# Patient Record
Sex: MALE | Race: WHITE | NOT HISPANIC OR LATINO | URBAN - METROPOLITAN AREA
[De-identification: names, ages, dates, MRNs, and addresses within clinical notes are randomized per-mention and may not be internally consistent; named-entity substitution may affect disease eponyms.]

---

## 2020-01-23 ENCOUNTER — APPOINTMENT (OUTPATIENT)
Dept: RADIOLOGY | Facility: MEDICAL CENTER | Age: 85
End: 2020-01-23
Attending: EMERGENCY MEDICINE
Payer: COMMERCIAL

## 2020-01-23 ENCOUNTER — HOSPITAL ENCOUNTER (OUTPATIENT)
Facility: MEDICAL CENTER | Age: 85
End: 2020-01-26
Attending: EMERGENCY MEDICINE | Admitting: INTERNAL MEDICINE
Payer: COMMERCIAL

## 2020-01-23 DIAGNOSIS — R55 SYNCOPE, UNSPECIFIED SYNCOPE TYPE: ICD-10-CM

## 2020-01-23 PROBLEM — F10.929 ALCOHOL INTOXICATION (HCC): Status: ACTIVE | Noted: 2020-01-23

## 2020-01-23 PROBLEM — Z95.0 PACEMAKER: Status: ACTIVE | Noted: 2020-01-23

## 2020-01-23 PROBLEM — D69.6 THROMBOCYTOPENIA (HCC): Status: ACTIVE | Noted: 2020-01-23

## 2020-01-23 PROBLEM — I25.10 CORONARY ARTERY DISEASE INVOLVING NATIVE CORONARY ARTERY: Status: ACTIVE | Noted: 2020-01-23

## 2020-01-23 LAB
ALBUMIN SERPL BCP-MCNC: 4 G/DL (ref 3.2–4.9)
ALBUMIN/GLOB SERPL: 1.3 G/DL
ALP SERPL-CCNC: 84 U/L (ref 30–99)
ALT SERPL-CCNC: 17 U/L (ref 2–50)
ANION GAP SERPL CALC-SCNC: 21 MMOL/L (ref 0–11.9)
APTT PPP: 31.3 SEC (ref 24.7–36)
AST SERPL-CCNC: 24 U/L (ref 12–45)
BASOPHILS # BLD AUTO: 0.8 % (ref 0–1.8)
BASOPHILS # BLD: 0.07 K/UL (ref 0–0.12)
BILIRUB SERPL-MCNC: 1.5 MG/DL (ref 0.1–1.5)
BUN SERPL-MCNC: 25 MG/DL (ref 8–22)
CALCIUM SERPL-MCNC: 9.1 MG/DL (ref 8.4–10.2)
CHLORIDE SERPL-SCNC: 107 MMOL/L (ref 96–112)
CO2 SERPL-SCNC: 17 MMOL/L (ref 20–33)
COMMENT 1642: NORMAL
CREAT SERPL-MCNC: 1.59 MG/DL (ref 0.5–1.4)
EKG IMPRESSION: NORMAL
EOSINOPHIL # BLD AUTO: 0.13 K/UL (ref 0–0.51)
EOSINOPHIL NFR BLD: 1.4 % (ref 0–6.9)
ERYTHROCYTE [DISTWIDTH] IN BLOOD BY AUTOMATED COUNT: 54.4 FL (ref 35.9–50)
ETHANOL BLD-MCNC: 76 MG/DL (ref 0–10)
GLOBULIN SER CALC-MCNC: 3 G/DL (ref 1.9–3.5)
GLUCOSE SERPL-MCNC: 131 MG/DL (ref 65–99)
HCT VFR BLD AUTO: 48.2 % (ref 42–52)
HGB BLD-MCNC: 15.4 G/DL (ref 14–18)
IMM GRANULOCYTES # BLD AUTO: 0.03 K/UL (ref 0–0.11)
IMM GRANULOCYTES NFR BLD AUTO: 0.3 % (ref 0–0.9)
INR PPP: 1.47 (ref 0.87–1.13)
LYMPHOCYTES # BLD AUTO: 1.61 K/UL (ref 1–4.8)
LYMPHOCYTES NFR BLD: 17.4 % (ref 22–41)
MCH RBC QN AUTO: 29.1 PG (ref 27–33)
MCHC RBC AUTO-ENTMCNC: 32 G/DL (ref 33.7–35.3)
MCV RBC AUTO: 91.1 FL (ref 81.4–97.8)
MONOCYTES # BLD AUTO: 0.67 K/UL (ref 0–0.85)
MONOCYTES NFR BLD AUTO: 7.3 % (ref 0–13.4)
NEUTROPHILS # BLD AUTO: 6.73 K/UL (ref 1.82–7.42)
NEUTROPHILS NFR BLD: 72.8 % (ref 44–72)
NRBC # BLD AUTO: 0 K/UL
NRBC BLD-RTO: 0 /100 WBC
PLATELET # BLD AUTO: 68 K/UL (ref 164–446)
PMV BLD AUTO: 11.8 FL (ref 9–12.9)
POTASSIUM SERPL-SCNC: 4.1 MMOL/L (ref 3.6–5.5)
PROT SERPL-MCNC: 7 G/DL (ref 6–8.2)
PROTHROMBIN TIME: 18.1 SEC (ref 12–14.6)
RBC # BLD AUTO: 5.29 M/UL (ref 4.7–6.1)
SODIUM SERPL-SCNC: 145 MMOL/L (ref 135–145)
TROPONIN T SERPL-MCNC: 44 NG/L (ref 6–19)
WBC # BLD AUTO: 9.2 K/UL (ref 4.8–10.8)

## 2020-01-23 PROCEDURE — G0378 HOSPITAL OBSERVATION PER HR: HCPCS

## 2020-01-23 PROCEDURE — 71045 X-RAY EXAM CHEST 1 VIEW: CPT

## 2020-01-23 PROCEDURE — 80053 COMPREHEN METABOLIC PANEL: CPT

## 2020-01-23 PROCEDURE — 93005 ELECTROCARDIOGRAM TRACING: CPT | Performed by: EMERGENCY MEDICINE

## 2020-01-23 PROCEDURE — 85025 COMPLETE CBC W/AUTO DIFF WBC: CPT

## 2020-01-23 PROCEDURE — 700111 HCHG RX REV CODE 636 W/ 250 OVERRIDE (IP)

## 2020-01-23 PROCEDURE — 70450 CT HEAD/BRAIN W/O DYE: CPT

## 2020-01-23 PROCEDURE — 80307 DRUG TEST PRSMV CHEM ANLYZR: CPT

## 2020-01-23 PROCEDURE — 99219 PR INITIAL OBSERVATION CARE,LEVL II: CPT | Performed by: INTERNAL MEDICINE

## 2020-01-23 PROCEDURE — 85610 PROTHROMBIN TIME: CPT

## 2020-01-23 PROCEDURE — 72125 CT NECK SPINE W/O DYE: CPT

## 2020-01-23 PROCEDURE — 85730 THROMBOPLASTIN TIME PARTIAL: CPT

## 2020-01-23 PROCEDURE — 99285 EMERGENCY DEPT VISIT HI MDM: CPT

## 2020-01-23 PROCEDURE — 84484 ASSAY OF TROPONIN QUANT: CPT

## 2020-01-23 RX ORDER — AMOXICILLIN 250 MG
2 CAPSULE ORAL 2 TIMES DAILY
Status: DISCONTINUED | OUTPATIENT
Start: 2020-01-23 | End: 2020-01-26 | Stop reason: HOSPADM

## 2020-01-23 RX ORDER — POLYETHYLENE GLYCOL 3350 17 G/17G
1 POWDER, FOR SOLUTION ORAL
Status: DISCONTINUED | OUTPATIENT
Start: 2020-01-23 | End: 2020-01-26 | Stop reason: HOSPADM

## 2020-01-23 RX ORDER — FOLIC ACID 1 MG/1
1 TABLET ORAL DAILY
Status: DISCONTINUED | OUTPATIENT
Start: 2020-01-24 | End: 2020-01-26 | Stop reason: HOSPADM

## 2020-01-23 RX ORDER — ONDANSETRON 2 MG/ML
4 INJECTION INTRAMUSCULAR; INTRAVENOUS ONCE
Status: ACTIVE | OUTPATIENT
Start: 2020-01-23 | End: 2020-01-24

## 2020-01-23 RX ORDER — THIAMINE MONONITRATE (VIT B1) 100 MG
100 TABLET ORAL DAILY
Status: DISCONTINUED | OUTPATIENT
Start: 2020-01-24 | End: 2020-01-26 | Stop reason: HOSPADM

## 2020-01-23 RX ORDER — ONDANSETRON 2 MG/ML
INJECTION INTRAMUSCULAR; INTRAVENOUS
Status: COMPLETED
Start: 2020-01-23 | End: 2020-01-23

## 2020-01-23 RX ORDER — BISACODYL 10 MG
10 SUPPOSITORY, RECTAL RECTAL
Status: DISCONTINUED | OUTPATIENT
Start: 2020-01-23 | End: 2020-01-26 | Stop reason: HOSPADM

## 2020-01-23 RX ADMIN — ONDANSETRON 4 MG: 2 INJECTION INTRAMUSCULAR; INTRAVENOUS at 16:06

## 2020-01-23 ASSESSMENT — COGNITIVE AND FUNCTIONAL STATUS - GENERAL
DAILY ACTIVITIY SCORE: 24
SUGGESTED CMS G CODE MODIFIER DAILY ACTIVITY: CH
MOBILITY SCORE: 24
SUGGESTED CMS G CODE MODIFIER MOBILITY: CH

## 2020-01-23 ASSESSMENT — ENCOUNTER SYMPTOMS
ABDOMINAL PAIN: 0
NAUSEA: 0
WEAKNESS: 0
NERVOUS/ANXIOUS: 0
DIARRHEA: 0
MYALGIAS: 0
DIZZINESS: 0
FEVER: 0
SHORTNESS OF BREATH: 0
DEPRESSION: 1
FOCAL WEAKNESS: 0
CHILLS: 0
STRIDOR: 0
CONSTIPATION: 0
DIAPHORESIS: 0
COUGH: 0
BACK PAIN: 0
INSOMNIA: 0
TINGLING: 0
HEADACHES: 0
WHEEZING: 0

## 2020-01-23 ASSESSMENT — LIFESTYLE VARIABLES
ON A TYPICAL DAY WHEN YOU DRINK ALCOHOL HOW MANY DRINKS DO YOU HAVE: 1
EVER_SMOKED: YES
EVER HAD A DRINK FIRST THING IN THE MORNING TO STEADY YOUR NERVES TO GET RID OF A HANGOVER: NO
HOW MANY TIMES IN THE PAST YEAR HAVE YOU HAD 5 OR MORE DRINKS IN A DAY: 0
ALCOHOL_USE: YES
TOTAL SCORE: 0
TOTAL SCORE: 0
CONSUMPTION TOTAL: NEGATIVE
EVER FELT BAD OR GUILTY ABOUT YOUR DRINKING: NO
EVER_SMOKED: YES
HAVE PEOPLE ANNOYED YOU BY CRITICIZING YOUR DRINKING: NO
AVERAGE NUMBER OF DAYS PER WEEK YOU HAVE A DRINK CONTAINING ALCOHOL: 2
HAVE YOU EVER FELT YOU SHOULD CUT DOWN ON YOUR DRINKING: NO
TOTAL SCORE: 0

## 2020-01-23 ASSESSMENT — COPD QUESTIONNAIRES
HAVE YOU SMOKED AT LEAST 100 CIGARETTES IN YOUR ENTIRE LIFE: NO/DON'T KNOW
DURING THE PAST 4 WEEKS HOW MUCH DID YOU FEEL SHORT OF BREATH: NONE/LITTLE OF THE TIME
DO YOU EVER COUGH UP ANY MUCUS OR PHLEGM?: NO/ONLY WITH OCCASIONAL COLDS OR INFECTIONS
COPD SCREENING SCORE: 2

## 2020-01-23 ASSESSMENT — PATIENT HEALTH QUESTIONNAIRE - PHQ9
2. FEELING DOWN, DEPRESSED, IRRITABLE, OR HOPELESS: NOT AT ALL
1. LITTLE INTEREST OR PLEASURE IN DOING THINGS: NOT AT ALL
SUM OF ALL RESPONSES TO PHQ9 QUESTIONS 1 AND 2: 0

## 2020-01-24 ENCOUNTER — PATIENT OUTREACH (OUTPATIENT)
Dept: HEALTH INFORMATION MANAGEMENT | Facility: OTHER | Age: 85
End: 2020-01-24

## 2020-01-24 ENCOUNTER — APPOINTMENT (OUTPATIENT)
Dept: CARDIOLOGY | Facility: MEDICAL CENTER | Age: 85
End: 2020-01-24
Attending: INTERNAL MEDICINE
Payer: COMMERCIAL

## 2020-01-24 PROBLEM — I50.23 ACUTE ON CHRONIC SYSTOLIC CONGESTIVE HEART FAILURE (HCC): Status: ACTIVE | Noted: 2020-01-24

## 2020-01-24 PROBLEM — I51.3 LV (LEFT VENTRICULAR) MURAL THROMBUS: Status: ACTIVE | Noted: 2020-01-24

## 2020-01-24 LAB
ANION GAP SERPL CALC-SCNC: 11 MMOL/L (ref 0–11.9)
BASOPHILS # BLD AUTO: 0.6 % (ref 0–1.8)
BASOPHILS # BLD: 0.06 K/UL (ref 0–0.12)
BUN SERPL-MCNC: 27 MG/DL (ref 8–22)
CALCIUM SERPL-MCNC: 8.9 MG/DL (ref 8.4–10.2)
CHLORIDE SERPL-SCNC: 108 MMOL/L (ref 96–112)
CO2 SERPL-SCNC: 26 MMOL/L (ref 20–33)
CREAT SERPL-MCNC: 1.67 MG/DL (ref 0.5–1.4)
EOSINOPHIL # BLD AUTO: 0.13 K/UL (ref 0–0.51)
EOSINOPHIL NFR BLD: 1.4 % (ref 0–6.9)
ERYTHROCYTE [DISTWIDTH] IN BLOOD BY AUTOMATED COUNT: 54.9 FL (ref 35.9–50)
GLUCOSE SERPL-MCNC: 112 MG/DL (ref 65–99)
HCT VFR BLD AUTO: 46.6 % (ref 42–52)
HGB BLD-MCNC: 14.8 G/DL (ref 14–18)
IMM GRANULOCYTES # BLD AUTO: 0.02 K/UL (ref 0–0.11)
IMM GRANULOCYTES NFR BLD AUTO: 0.2 % (ref 0–0.9)
LV EJECT FRACT  99904: 30
LV EJECT FRACT MOD 2C 99903: 31.9
LV EJECT FRACT MOD 4C 99902: 30.99
LV EJECT FRACT MOD BP 99901: 30.75
LYMPHOCYTES # BLD AUTO: 1.34 K/UL (ref 1–4.8)
LYMPHOCYTES NFR BLD: 14.2 % (ref 22–41)
MCH RBC QN AUTO: 29.2 PG (ref 27–33)
MCHC RBC AUTO-ENTMCNC: 31.8 G/DL (ref 33.7–35.3)
MCV RBC AUTO: 91.9 FL (ref 81.4–97.8)
MONOCYTES # BLD AUTO: 0.78 K/UL (ref 0–0.85)
MONOCYTES NFR BLD AUTO: 8.3 % (ref 0–13.4)
NEUTROPHILS # BLD AUTO: 7.12 K/UL (ref 1.82–7.42)
NEUTROPHILS NFR BLD: 75.3 % (ref 44–72)
NRBC # BLD AUTO: 0 K/UL
NRBC BLD-RTO: 0 /100 WBC
PLATELET # BLD AUTO: 159 K/UL (ref 164–446)
PMV BLD AUTO: 10.7 FL (ref 9–12.9)
POTASSIUM SERPL-SCNC: 4 MMOL/L (ref 3.6–5.5)
RBC # BLD AUTO: 5.07 M/UL (ref 4.7–6.1)
SODIUM SERPL-SCNC: 145 MMOL/L (ref 135–145)
WBC # BLD AUTO: 9.5 K/UL (ref 4.8–10.8)

## 2020-01-24 PROCEDURE — A9270 NON-COVERED ITEM OR SERVICE: HCPCS | Performed by: INTERNAL MEDICINE

## 2020-01-24 PROCEDURE — 700117 HCHG RX CONTRAST REV CODE 255: Performed by: INTERNAL MEDICINE

## 2020-01-24 PROCEDURE — 700111 HCHG RX REV CODE 636 W/ 250 OVERRIDE (IP): Performed by: INTERNAL MEDICINE

## 2020-01-24 PROCEDURE — 93306 TTE W/DOPPLER COMPLETE: CPT

## 2020-01-24 PROCEDURE — 700102 HCHG RX REV CODE 250 W/ 637 OVERRIDE(OP): Performed by: INTERNAL MEDICINE

## 2020-01-24 PROCEDURE — 85025 COMPLETE CBC W/AUTO DIFF WBC: CPT

## 2020-01-24 PROCEDURE — 96374 THER/PROPH/DIAG INJ IV PUSH: CPT | Mod: XU

## 2020-01-24 PROCEDURE — 99226 PR SUBSEQUENT OBSERVATION CARE,LEVEL III: CPT | Performed by: INTERNAL MEDICINE

## 2020-01-24 PROCEDURE — G0378 HOSPITAL OBSERVATION PER HR: HCPCS

## 2020-01-24 PROCEDURE — 80048 BASIC METABOLIC PNL TOTAL CA: CPT

## 2020-01-24 PROCEDURE — 93306 TTE W/DOPPLER COMPLETE: CPT | Mod: 26 | Performed by: INTERNAL MEDICINE

## 2020-01-24 RX ORDER — TAMSULOSIN HYDROCHLORIDE 0.4 MG/1
0.4 CAPSULE ORAL
COMMUNITY

## 2020-01-24 RX ORDER — SPIRONOLACTONE 25 MG/1
25 TABLET ORAL
Status: DISCONTINUED | OUTPATIENT
Start: 2020-01-24 | End: 2020-01-26 | Stop reason: HOSPADM

## 2020-01-24 RX ORDER — LOSARTAN POTASSIUM 25 MG/1
50 TABLET ORAL DAILY
Status: DISCONTINUED | OUTPATIENT
Start: 2020-01-24 | End: 2020-01-26 | Stop reason: HOSPADM

## 2020-01-24 RX ORDER — ALLOPURINOL 100 MG/1
100 TABLET ORAL 2 TIMES DAILY
COMMUNITY

## 2020-01-24 RX ORDER — TAMSULOSIN HYDROCHLORIDE 0.4 MG/1
0.4 CAPSULE ORAL
Status: DISCONTINUED | OUTPATIENT
Start: 2020-01-24 | End: 2020-01-26 | Stop reason: HOSPADM

## 2020-01-24 RX ORDER — ATORVASTATIN CALCIUM 40 MG/1
40 TABLET, FILM COATED ORAL NIGHTLY
COMMUNITY

## 2020-01-24 RX ORDER — FUROSEMIDE 10 MG/ML
20 INJECTION INTRAMUSCULAR; INTRAVENOUS ONCE
Status: COMPLETED | OUTPATIENT
Start: 2020-01-24 | End: 2020-01-24

## 2020-01-24 RX ORDER — METOPROLOL SUCCINATE 25 MG/1
25 TABLET, EXTENDED RELEASE ORAL
Status: DISCONTINUED | OUTPATIENT
Start: 2020-01-24 | End: 2020-01-26 | Stop reason: HOSPADM

## 2020-01-24 RX ORDER — FINASTERIDE 5 MG/1
5 TABLET, FILM COATED ORAL DAILY
Status: DISCONTINUED | OUTPATIENT
Start: 2020-01-24 | End: 2020-01-26 | Stop reason: HOSPADM

## 2020-01-24 RX ORDER — ATORVASTATIN CALCIUM 40 MG/1
40 TABLET, FILM COATED ORAL NIGHTLY
Status: DISCONTINUED | OUTPATIENT
Start: 2020-01-24 | End: 2020-01-26 | Stop reason: HOSPADM

## 2020-01-24 RX ORDER — WARFARIN SODIUM 5 MG/1
5 TABLET ORAL
Status: COMPLETED | OUTPATIENT
Start: 2020-01-24 | End: 2020-01-24

## 2020-01-24 RX ORDER — FUROSEMIDE 10 MG/ML
40 INJECTION INTRAMUSCULAR; INTRAVENOUS
Status: DISCONTINUED | OUTPATIENT
Start: 2020-01-25 | End: 2020-01-25

## 2020-01-24 RX ORDER — LOSARTAN POTASSIUM 50 MG/1
50 TABLET ORAL DAILY
COMMUNITY

## 2020-01-24 RX ORDER — WARFARIN SODIUM 5 MG/1
5 TABLET ORAL DAILY
COMMUNITY

## 2020-01-24 RX ORDER — FINASTERIDE 5 MG/1
5 TABLET, FILM COATED ORAL DAILY
COMMUNITY

## 2020-01-24 RX ORDER — WARFARIN SODIUM 5 MG/1
5 TABLET ORAL DAILY
Status: DISCONTINUED | OUTPATIENT
Start: 2020-01-24 | End: 2020-01-24

## 2020-01-24 RX ORDER — FUROSEMIDE 40 MG/1
60 TABLET ORAL DAILY
COMMUNITY

## 2020-01-24 RX ADMIN — FOLIC ACID 1 MG: 1 TABLET ORAL at 05:42

## 2020-01-24 RX ADMIN — TAMSULOSIN HYDROCHLORIDE 0.4 MG: 0.4 CAPSULE ORAL at 14:17

## 2020-01-24 RX ADMIN — ATORVASTATIN CALCIUM 40 MG: 40 TABLET, FILM COATED ORAL at 21:15

## 2020-01-24 RX ADMIN — Medication 100 MG: at 05:42

## 2020-01-24 RX ADMIN — FUROSEMIDE 20 MG: 10 INJECTION, SOLUTION INTRAMUSCULAR; INTRAVENOUS at 14:15

## 2020-01-24 RX ADMIN — WARFARIN SODIUM 5 MG: 5 TABLET ORAL at 17:05

## 2020-01-24 RX ADMIN — HUMAN ALBUMIN MICROSPHERES AND PERFLUTREN 3 ML: 10; .22 INJECTION, SOLUTION INTRAVENOUS at 09:30

## 2020-01-24 RX ADMIN — SPIRONOLACTONE 25 MG: 25 TABLET ORAL at 14:16

## 2020-01-24 RX ADMIN — METOPROLOL SUCCINATE 25 MG: 25 TABLET, EXTENDED RELEASE ORAL at 14:16

## 2020-01-24 RX ADMIN — FINASTERIDE 5 MG: 5 TABLET, FILM COATED ORAL at 14:16

## 2020-01-24 RX ADMIN — LOSARTAN POTASSIUM 50 MG: 25 TABLET ORAL at 14:16

## 2020-01-24 ASSESSMENT — ENCOUNTER SYMPTOMS
BLURRED VISION: 0
EYE PAIN: 0
DIAPHORESIS: 0
SHORTNESS OF BREATH: 1
WHEEZING: 0
VOMITING: 0
NECK PAIN: 0
ORTHOPNEA: 0
HEADACHES: 0
COUGH: 0
CHILLS: 0
SPUTUM PRODUCTION: 0
SEIZURES: 0
HEMOPTYSIS: 0
DIZZINESS: 0
DEPRESSION: 0
FOCAL WEAKNESS: 0
DIARRHEA: 0
WEAKNESS: 1
FALLS: 1
SPEECH CHANGE: 0
NAUSEA: 0
ABDOMINAL PAIN: 0
TINGLING: 0
HEARTBURN: 0
TREMORS: 0
BACK PAIN: 0
FEVER: 0
DOUBLE VISION: 0
PALPITATIONS: 0
LOSS OF CONSCIOUSNESS: 1
WEIGHT LOSS: 0
CONSTIPATION: 0
PND: 0

## 2020-01-24 ASSESSMENT — CHA2DS2 SCORE
CHA2DS2 VASC SCORE: 6
HYPERTENSION: YES
DIABETES: YES
AGE 75 OR GREATER: YES
AGE 65 TO 74: NO
SEX: MALE
PRIOR STROKE OR TIA OR THROMBOEMBOLISM: NO
CHF OR LEFT VENTRICULAR DYSFUNCTION: YES
VASCULAR DISEASE: YES

## 2020-01-24 ASSESSMENT — PATIENT HEALTH QUESTIONNAIRE - PHQ9
SUM OF ALL RESPONSES TO PHQ9 QUESTIONS 1 AND 2: 0
2. FEELING DOWN, DEPRESSED, IRRITABLE, OR HOPELESS: NOT AT ALL
1. LITTLE INTEREST OR PLEASURE IN DOING THINGS: NOT AT ALL

## 2020-01-24 ASSESSMENT — LIFESTYLE VARIABLES: SUBSTANCE_ABUSE: 0

## 2020-01-24 NOTE — PROGRESS NOTES
Inpatient Anticoagulation Service Note    Date: 1/24/2020    Reason for Anticoagulation: Atrial Fibrillation, Myocardial Infarction   Target INR: 2.0 to 3.0  [id number] VASc Score: 6  HAS-BLED Score: 2   Hemoglobin Value: 14.8  Hematocrit Value: 46.6  Lab Platelet Value: (!) 159    INR from last 7 days     Date/Time INR Value    01/23/20 1635  (!) 1.47        Dose from last 7 days     Date/Time Dose (mg)    01/24/20 1415  5        Significant Interactions: Statin   Home regimen: 5 mg daily as reported by outpatient pharmacy  Last dose of warfarin unknown.     Plan:  Warfarin 5 mg PO tonight for INR 1.47     Pharmacist suggested discharge dosing: possibly 5 mg daily     Reyna Dias, LaurenD

## 2020-01-24 NOTE — CARE PLAN
Problem: Respiratory:  Goal: Respiratory status will improve  Outcome: PROGRESSING AS EXPECTED  Intervention: Administer and titrate oxygen therapy  Note:   Pt on RA at home, currently on 3L oximask. Regularly assessing oxygenation. Plan to ambulate with oxygen later today.     Problem: Knowledge Deficit  Goal: Knowledge of disease process/condition, treatment plan, diagnostic tests, and medications will improve  Intervention: Explain information regarding disease process/condition, treatment plan, diagnostic tests, and medications and document in education  Note:   Will educate the pt on the disease process and recommended interventions. RN will allow time for pt to ask questions. Will regularly assess pt's knowledge level and address and deficits.

## 2020-01-24 NOTE — ASSESSMENT & PLAN NOTE
Patient has scar over his chest consistent with prior heart surgery, will check an echocardiogram due to his syncopal event

## 2020-01-24 NOTE — PROGRESS NOTES
Med rec complete per [facility] and [facility]  Allergies reviewed    Patient did not know his medications, he stated he filled at [facility] (when called it had been 10 months) then called [facility] and had meds filled in September and October     Unknown last doses and how compliant patient is.    Unknown Warfarin dosing ( last filled in September)

## 2020-01-24 NOTE — ASSESSMENT & PLAN NOTE
With loss of bowel control, will monitor on telemetry and ambulate the patient with staff  Will order orthostatic blood pressure measurements, negative  I suspect his syncope was due to alcohol consumption

## 2020-01-24 NOTE — ASSESSMENT & PLAN NOTE
Patient has positive BAL, monitor for withdrawal as alcohol level drops  Will order thiamine and folate

## 2020-01-24 NOTE — PROGRESS NOTES
Pt arrived to unit via gurney. Ambulated from gurney to bed, stand by assist. Tele monitor applied, vitals taken. Pt assessed. A&O x4. Discussed POC with pt, including echocardiogram. Welcome folder provided and discussed. Communication board filled out. Questions and concerns addressed, verbalized understanding. Fall precautions in place. Pt demonstrates ability to use call light appropriately. Pt left in lowest position. Bed locked and low, bed alarm on.

## 2020-01-24 NOTE — PROGRESS NOTES
[facility] Medicine Daily Progress Note    Date of Service  1/24/2020    Chief Complaint  84 y.o. male admitted 1/23/2020 with syncope event and also complains of shortness of breath    Hospital Course    Past medical history of heart failure presented with complaint of syncope.  Patient was also noticed to have hypoxemia.  Patient was noticed to have CHF exacerbation and started on diuretics IV.      Interval Problem Update  1/24.  Patient has been relatively stable overnight.  Still complains of shortness of breath and requires oxygen.  Patient has not been compliant with medication at home.  Echocardiogram showing no ejection fraction but not worsening compared to previously. Patient's pain is general 2-3/10, intermittent and does not radiate to other location, sharp and with some tingling. Can be controlled by pain meds.    Consultants/Specialty  none    Code Status  full    Disposition  tbd    Review of Systems  Review of Systems   Constitutional: Positive for malaise/fatigue. Negative for chills, diaphoresis, fever and weight loss.   HENT: Negative for congestion, ear discharge, ear pain, hearing loss and nosebleeds.    Eyes: Negative for blurred vision, double vision and pain.   Respiratory: Positive for shortness of breath. Negative for cough, hemoptysis, sputum production and wheezing.    Cardiovascular: Negative for chest pain, palpitations, orthopnea, leg swelling and PND.   Gastrointestinal: Negative for abdominal pain, constipation, diarrhea, heartburn, nausea and vomiting.   Genitourinary: Negative for dysuria, frequency and hematuria.   Musculoskeletal: Positive for falls. Negative for back pain, joint pain and neck pain.   Skin: Negative for rash.   Neurological: Positive for loss of consciousness and weakness. Negative for dizziness, tingling, tremors, speech change, focal weakness, seizures and headaches.   Psychiatric/Behavioral: Negative for depression, substance abuse and suicidal ideas.         Physical Exam  Temp:  [35.8 °C (96.4 °F)-36.6 °C (97.9 °F)] 36.3 °C (97.3 °F)  Pulse:  [64-97] 77  Resp:  [7-26] 22  BP: (107-151)/(54-95) 130/60  SpO2:  [85 %-96 %] 91 %    Physical Exam  Constitutional:       General: He is not in acute distress.     Appearance: Normal appearance. He is not toxic-appearing.   HENT:      Head: Normocephalic and atraumatic.      Right Ear: Tympanic membrane and external ear normal.      Left Ear: Tympanic membrane and external ear normal.      Nose: No congestion or rhinorrhea.      Mouth/Throat:      Mouth: Mucous membranes are moist.      Pharynx: Oropharynx is clear. No oropharyngeal exudate.   Eyes:      Extraocular Movements: Extraocular movements intact.      Conjunctiva/sclera: Conjunctivae normal.      Pupils: Pupils are equal, round, and reactive to light.   Neck:      Musculoskeletal: Normal range of motion and neck supple. No neck rigidity or muscular tenderness.   Cardiovascular:      Rate and Rhythm: Normal rate and regular rhythm.      Pulses: Normal pulses.      Heart sounds: Normal heart sounds. No friction rub. No gallop.    Pulmonary:      Effort: Pulmonary effort is normal. No respiratory distress.      Breath sounds: Normal breath sounds. No stridor. No wheezing or rales.   Chest:      Chest wall: No tenderness.   Abdominal:      General: Abdomen is flat. Bowel sounds are normal. There is no distension.      Palpations: Abdomen is soft.      Tenderness: There is no guarding or rebound.      Hernia: No hernia is present.   Musculoskeletal: Normal range of motion.         General: No swelling or deformity.   Skin:     General: Skin is warm and dry.      Capillary Refill: Capillary refill takes more than 3 seconds.   Neurological:      General: No focal deficit present.      Mental Status: He is alert and oriented to person, place, and time. Mental status is at baseline.      Motor: No weakness.   Psychiatric:         Mood and Affect: Mood normal.          Behavior: Behavior normal.         Fluids    Intake/Output Summary (Last 24 hours) at 1/24/2020 1534  Last data filed at 1/24/2020 1300  Gross per 24 hour   Intake 1066 ml   Output --   Net 1066 ml       Laboratory  Recent Labs     01/23/20  1600 01/24/20  0233   WBC 9.2 9.5   RBC 5.29 5.07   HEMOGLOBIN 15.4 14.8   HEMATOCRIT 48.2 46.6   MCV 91.1 91.9   MCH 29.1 29.2   MCHC 32.0* 31.8*   RDW 54.4* 54.9*   PLATELETCT 68* 159*   MPV 11.8 10.7     Recent Labs     01/23/20  1600 01/24/20  0233   SODIUM 145 145   POTASSIUM 4.1 4.0   CHLORIDE 107 108   CO2 17* 26   GLUCOSE 131* 112*   BUN 25* 27*   CREATININE 1.59* 1.67*   CALCIUM 9.1 8.9     Recent Labs     01/23/20  1635   APTT 31.3   INR 1.47*               Imaging  EC-ECHOCARDIOGRAM COMPLETE W/ CONT   Final Result      DX-CHEST-PORTABLE (1 VIEW)   Final Result      1.  Cardiomegaly. Calcification projecting over the left cardiac silhouette may be related to prior infarct.      2.  Mild interstitial edema.      CT-CSPINE WITHOUT PLUS RECONS   Final Result         1.  Negative CT scan of the cervical spine.  No acute fracture or subluxation.      2.  Multilevel degenerative change greatest at C1-2 and C5-6.         CT-HEAD W/O   Final Result      1.  No acute intracranial process.      2.  Atrophy and small vessel ischemic change.           Assessment/Plan  LV (left ventricular) mural thrombus  Assessment & Plan  Patient echocardiogram showing LV thrombus.  Medical record required and obtained from patient's previous cardiology and showing similar ejection fraction 25 to 30% with LV thrombus.    Apparently patient has been on anticoagulation therapy as outpatient warfarin and INR 1.47.  Restart warfarin and goal is 2-3.  However due to patient's LV thrombus likely already chronic we will not bridge with heparin.    Acute on chronic systolic congestive heart failure (HCC)  Assessment & Plan  Acute on chronic CHF exacerbation  I's and O's  Cardiac diet  Fluid  restriction  IV Lasix 20 mg bid  Keep potassium above 4 and magnesium above 2  Cont ACEI BBL added spironolactone      Alcohol intoxication (HCC)  Assessment & Plan  Patient has positive BAL, monitor for withdrawal as alcohol level drops  Will order thiamine and folate    Pacemaker  Assessment & Plan  In place and functioning    Coronary artery disease involving native coronary artery  Assessment & Plan  Patient has scar over his chest consistent with prior heart surgery, will check an echocardiogram due to his syncopal event    Syncope and collapse  Assessment & Plan  With loss of bowel control, will monitor on telemetry and ambulate the patient with staff  Will order orthostatic blood pressure measurements, negative  I suspect his syncope was due to alcohol consumption    Thrombocytopenia (HCC)  Assessment & Plan  No evidence of bleed  Will monitor level         VTE prophylaxis: SCD and warfarin      Current Facility-Administered Medications:   •  atorvastatin (LIPITOR) tablet 40 mg, 40 mg, Oral, Nightly, Diamond Baig M.D.  •  finasteride (PROSCAR) tablet 5 mg, 5 mg, Oral, DAILY, Diamond Baig M.D., 5 mg at 01/24/20 1416  •  losartan (COZAAR) tablet 50 mg, 50 mg, Oral, DAILY, Diamond Baig M.D., 50 mg at 01/24/20 1416  •  tamsulosin (FLOMAX) capsule 0.4 mg, 0.4 mg, Oral, AFTER BREAKFAST, Diamond Baig M.D., 0.4 mg at 01/24/20 1417  •  metoprolol SR (TOPROL XL) tablet 25 mg, 25 mg, Oral, Q DAY, Diamond Baig M.D., 25 mg at 01/24/20 1416  •  spironolactone (ALDACTONE) tablet 25 mg, 25 mg, Oral, Q DAY, Diamond Baig M.D., 25 mg at 01/24/20 1416  •  [START ON 1/25/2020] furosemide (LASIX) injection 40 mg, 40 mg, Intravenous, Q DAY, Diamond Baig M.D.  •  MD Alert...Warfarin per Pharmacy, , Other, PHARMACY TO DOSE, Diamond Baig M.D.  •  warfarin (COUMADIN) tablet 5 mg, 5 mg, Oral, ONCE AT 1800, Diamond Baig M.D.  •  ondansetron (ZOFRAN) syringe/vial injection 4 mg, 4 mg, Intravenous, Once, Herrera Womack M.D.  •  senna-docusate (PERICOLACE or  SENOKOT S) 8.6-50 MG per tablet 2 Tab, 2 Tab, Oral, BID **AND** polyethylene glycol/lytes (MIRALAX) PACKET 1 Packet, 1 Packet, Oral, QDAY PRN **AND** magnesium hydroxide (MILK OF MAGNESIA) suspension 30 mL, 30 mL, Oral, QDAY PRN **AND** bisacodyl (DULCOLAX) suppository 10 mg, 10 mg, Rectal, QDAY PRN, Pippa Sweeney M.D.  •  Respiratory Care per Protocol, , Nebulization, Continuous RT, Pippa Sweeney M.D.  •  thiamine tablet 100 mg, 100 mg, Oral, DAILY, Pippa Sweeney M.D., 100 mg at 01/24/20 0542  •  folic acid (FOLVITE) tablet 1 mg, 1 mg, Oral, DAILY, Pippa Sweeney M.D., 1 mg at 01/24/20 0542

## 2020-01-24 NOTE — PROGRESS NOTES
Pt reports that he drove himself to [facility] and was then transported via ambulance to [facility]. Pt's story conflicts with ED report: Per ED report, pt passed out at a bar and was taken from there via ambulance to here.    Pt wants his car and asked his friends to retrieve it for him for when he discharges.    Pt's friends Dylon and Ed are trying to locate pt's car at [facility], where pt usually frequents, w/o success. Upon further questioning of pt, he states that if he was at a bar it would have been the nugget and his car would be in the outdoor parking lot. The friends are going to try there.

## 2020-01-24 NOTE — H&P
Hospital Medicine History & Physical Note    Date of Service  1/23/2020    Primary Care Physician  None    Consultants  none    Code Status  full    Chief Complaint  syncope    History of Presenting Illness  84 y.o. male who presented 1/23/2020 with loss of consciousness while sitting on a stool eating lunch at a local bar. He lost control of his bowels and slumped over but did not hit his head. Currently he cannot recall the incident and has difficulty remembering his medical history or whether he drank alcohol today or not. He denies any headache, nausea, chest pain, shortness of breath or itching.    Review of Systems  Review of Systems   Constitutional: Negative for chills, diaphoresis, fever and malaise/fatigue.   HENT: Negative for congestion.    Respiratory: Negative for cough, shortness of breath, wheezing and stridor.    Cardiovascular: Negative for chest pain and leg swelling.   Gastrointestinal: Negative for abdominal pain, constipation, diarrhea and nausea.        Loss of bowel control   Genitourinary: Negative for dysuria, frequency and urgency.   Musculoskeletal: Negative for back pain, joint pain and myalgias.   Skin: Negative for itching and rash.   Neurological: Negative for dizziness, tingling, focal weakness, weakness and headaches.   Psychiatric/Behavioral: Positive for depression. The patient is not nervous/anxious and does not have insomnia.         Patient is sad because his girlfriend recently left him   All other systems reviewed and are negative.      Past Medical History  Coronary artery disease  Pacemaker in place    Surgical History  Open heart surgery    Family History  Father had heart disease    Social History   reports that he has never smoked. He has never used smokeless tobacco. He reports current alcohol use. He reports previous drug use.    Allergies  No Known Allergies    Medications  None       Physical Exam  Temp:  [35.8 °C (96.4 °F)] 35.8 °C (96.4 °F)  Pulse:  [64-71]  64  Resp:  [15-26] 15  BP: (109-110)/(54-69) 110/69  SpO2:  [85 %-95 %] 95 %    Physical Exam  Vitals signs and nursing note reviewed.   Constitutional:       General: He is not in acute distress.     Appearance: He is not ill-appearing or diaphoretic.      Comments: Some slurred speech   HENT:      Nose: No congestion or rhinorrhea.      Mouth/Throat:      Mouth: Mucous membranes are dry.      Pharynx: No oropharyngeal exudate or posterior oropharyngeal erythema.   Eyes:      General: No scleral icterus.     Extraocular Movements: Extraocular movements intact.      Conjunctiva/sclera: Conjunctivae normal.   Cardiovascular:      Rate and Rhythm: Normal rate and regular rhythm.   Pulmonary:      Effort: No respiratory distress.      Breath sounds: Normal breath sounds. No stridor. No wheezing.   Abdominal:      General: Bowel sounds are normal. There is no distension.      Tenderness: There is no tenderness.   Musculoskeletal:         General: No swelling.   Skin:     General: Skin is warm.      Capillary Refill: Capillary refill takes less than 2 seconds.      Coloration: Skin is not jaundiced or pale.      Findings: No erythema.   Neurological:      General: No focal deficit present.      Mental Status: He is alert.      Coordination: Coordination abnormal.   Psychiatric:         Mood and Affect: Mood normal.         Thought Content: Thought content normal.         Cognition and Memory: Cognition is impaired. Memory is impaired. He exhibits impaired recent memory and impaired remote memory.         Laboratory:  Recent Labs     01/23/20  1600   WBC 9.2   RBC 5.29   HEMOGLOBIN 15.4   HEMATOCRIT 48.2   MCV 91.1   MCH 29.1   MCHC 32.0*   RDW 54.4*   PLATELETCT 68*   MPV 11.8     Recent Labs     01/23/20  1600   SODIUM 145   POTASSIUM 4.1   CHLORIDE 107   CO2 17*   GLUCOSE 131*   BUN 25*   CREATININE 1.59*   CALCIUM 9.1     Recent Labs     01/23/20  1600   ALTSGPT 17   ASTSGOT 24   ALKPHOSPHAT 84   TBILIRUBIN 1.5    GLUCOSE 131*     Recent Labs     01/23/20  1635   APTT 31.3   INR 1.47*     No results for input(s): NTPROBNP in the last 72 hours.      Recent Labs     01/23/20  1600   TROPONINT 44*       Urinalysis:    No results found     Imaging:  DX-CHEST-PORTABLE (1 VIEW)   Final Result      1.  Cardiomegaly. Calcification projecting over the left cardiac silhouette may be related to prior infarct.      2.  Mild interstitial edema.      CT-CSPINE WITHOUT PLUS RECONS   Final Result         1.  Negative CT scan of the cervical spine.  No acute fracture or subluxation.      2.  Multilevel degenerative change greatest at C1-2 and C5-6.         CT-HEAD W/O   Final Result      1.  No acute intracranial process.      2.  Atrophy and small vessel ischemic change.      EC-ECHOCARDIOGRAM COMPLETE W/O CONT    (Results Pending)         Assessment/Plan:  I anticipate this patient is appropriate for observation status at this time.    Alcohol intoxication (HCC)  Assessment & Plan  Patient has positive BAL, monitor for withdrawal as alcohol level drops  Will order thiamine and folate    Pacemaker  Assessment & Plan  In place and functioning    Coronary artery disease involving native coronary artery  Assessment & Plan  Patient has scar over his chest consistent with prior heart surgery, will check an echocardiogram due to his syncopal event    Syncope and collapse  Assessment & Plan  With loss of bowel control, will monitor on telemetry and ambulate the patient with staff  Will order orthostatic blood pressure measurements  I suspect his syncope was due to alcohol consumption    Thrombocytopenia (HCC)  Assessment & Plan  No evidence of bleed  Will monitor level        VTE prophylaxis: ambulation

## 2020-01-24 NOTE — ASSESSMENT & PLAN NOTE
Patient echocardiogram showing LV thrombus.  Medical record required and obtained from patient's previous cardiology and showing similar ejection fraction 25 to 30% with LV thrombus.    Apparently patient has been on anticoagulation therapy as outpatient warfarin and INR 1.47.  Restart warfarin and goal is 2-3.  However due to patient's LV thrombus likely already chronic we will not bridge with heparin.

## 2020-01-24 NOTE — ED PROVIDER NOTES
ED Provider Note    CHIEF COMPLAINT  Chief Complaint   Patient presents with   • Syncope     pt was sitting at bar having lunch. bystanders said he put his head down like he was taking a nap, continued to then take off his shirt and then had syncopal episode. pt vomiting and incontinent of stool.       HPI  Jose Aguillon is a 84 y.o. male who presents after a syncopal episode.  The patient does not remember the event.  According to bystanders the patient was sitting on a stool when he put his head down like he was going to take a nap and then started taking his shirt off and then passed out.  The patient subsequently had recurrent emesis and was incontinent of stool.  The patient at this time is awake and appropriate.  He does not remember the event.  He states he does not feel well.  Does not have a headache.  He denies chest pain and palpitations.  He does not have any focal weakness.    REVIEW OF SYSTEMS  See HPI for further details. All other systems are negative.     PAST MEDICAL HISTORY  No past medical history on file.    [email or web address]    SOCIAL HISTORY  Social History     Socioeconomic History   • Marital status: Single     Spouse name: Not on file   • Number of children: Not on file   • Years of education: Not on file   • Highest education level: Not on file   Occupational History   • Not on file   Social Needs   • Financial resource strain: Not on file   • Food insecurity:     Worry: Not on file     Inability: Not on file   • Transportation needs:     Medical: Not on file     Non-medical: Not on file   Tobacco Use   • Smoking status: Never Smoker   • Smokeless tobacco: Never Used   Substance and Sexual Activity   • Alcohol use: Yes   • Drug use: Not Currently   • Sexual activity: Not on file   Lifestyle   • Physical activity:     Days per week: Not on file     Minutes per session: Not on file   • Stress: Not on file   Relationships   • Social connections:     Talks on phone: Not on file      "Gets together: Not on file     Attends Orthodoxy service: Not on file     Active member of club or organization: Not on file     Attends meetings of clubs or organizations: Not on file     Relationship status: Not on file   • Intimate partner violence:     Fear of current or ex partner: Not on file     Emotionally abused: Not on file     Physically abused: Not on file     Forced sexual activity: Not on file   Other Topics Concern   • Not on file   Social History Narrative   • Not on file       SURGICAL HISTORY  No past surgical history on file.    CURRENT MEDICATIONS  Home Medications    **Home medications have not yet been reviewed for this encounter**         ALLERGIES  No Known Allergies    PHYSICAL EXAM  VITAL SIGNS: /54   Pulse 71   Temp (!) 35.8 °C (96.4 °F)   Resp (!) 26   Ht 1.803 m (5' 11\")   Wt 90.7 kg (200 lb)   SpO2 (!) 85%   BMI 27.89 kg/m²       Constitutional: In distress on arrival with active emesis  HENT: Normocephalic, Atraumatic, Bilateral external ears normal, Oropharynx moist, No oral exudates, Nose normal.   Eyes: PERRLA, EOMI, Conjunctiva normal, No discharge.   Neck: Normal range of motion, No tenderness, Supple, No stridor.   Lymphatic: No lymphadenopathy noted.   Cardiovascular: Normal heart rate, Normal rhythm, No murmurs, No rubs, No gallops.   Thorax & Lungs: Normal breath sounds, No respiratory distress, No wheezing, No chest tenderness.   Abdomen: Bowel sounds normal, Soft, No tenderness, No masses, No pulsatile masses.   Skin: Warm, Dry, No erythema, No rash.   Back: No tenderness, No CVA tenderness.   Extremities: Intact distal pulses, No edema, No tenderness, No cyanosis, No clubbing.   Neurologic: Alert & oriented x 3, Normal motor function, Normal sensory function, No focal deficits noted.   Psychiatric: Affect normal, Judgment normal, Mood normal.     Results for orders placed or performed during the hospital encounter of 01/23/20   CBC WITH DIFFERENTIAL   Result " Value Ref Range    WBC 9.2 4.8 - 10.8 K/uL    RBC 5.29 4.70 - 6.10 M/uL    Hemoglobin 15.4 14.0 - 18.0 g/dL    Hematocrit 48.2 42.0 - 52.0 %    MCV 91.1 81.4 - 97.8 fL    MCH 29.1 27.0 - 33.0 pg    MCHC 32.0 (L) 33.7 - 35.3 g/dL    RDW 54.4 (H) 35.9 - 50.0 fL    Platelet Count 68 (L) 164 - 446 K/uL    MPV 11.8 9.0 - 12.9 fL    Neutrophils-Polys 72.80 (H) 44.00 - 72.00 %    Lymphocytes 17.40 (L) 22.00 - 41.00 %    Monocytes 7.30 0.00 - 13.40 %    Eosinophils 1.40 0.00 - 6.90 %    Basophils 0.80 0.00 - 1.80 %    Immature Granulocytes 0.30 0.00 - 0.90 %    Nucleated RBC 0.00 /100 WBC    Neutrophils (Absolute) 6.73 1.82 - 7.42 K/uL    Lymphs (Absolute) 1.61 1.00 - 4.80 K/uL    Monos (Absolute) 0.67 0.00 - 0.85 K/uL    Eos (Absolute) 0.13 0.00 - 0.51 K/uL    Baso (Absolute) 0.07 0.00 - 0.12 K/uL    Immature Granulocytes (abs) 0.03 0.00 - 0.11 K/uL    NRBC (Absolute) 0.00 K/uL   COMP METABOLIC PANEL   Result Value Ref Range    Sodium 145 135 - 145 mmol/L    Potassium 4.1 3.6 - 5.5 mmol/L    Chloride 107 96 - 112 mmol/L    Co2 17 (L) 20 - 33 mmol/L    Anion Gap 21.0 (H) 0.0 - 11.9    Glucose 131 (H) 65 - 99 mg/dL    Bun 25 (H) 8 - 22 mg/dL    Creatinine 1.59 (H) 0.50 - 1.40 mg/dL    Calcium 9.1 8.4 - 10.2 mg/dL    AST(SGOT) 24 12 - 45 U/L    ALT(SGPT) 17 2 - 50 U/L    Alkaline Phosphatase 84 30 - 99 U/L    Total Bilirubin 1.5 0.1 - 1.5 mg/dL    Albumin 4.0 3.2 - 4.9 g/dL    Total Protein 7.0 6.0 - 8.2 g/dL    Globulin 3.0 1.9 - 3.5 g/dL    A-G Ratio 1.3 g/dL   TROPONIN   Result Value Ref Range    Troponin T 44 (H) 6 - 19 ng/L   DIAGNOSTIC ALCOHOL   Result Value Ref Range    Diagnostic Alcohol 76 (H) 0 - 10 mg/dL   ESTIMATED GFR   Result Value Ref Range    GFR If  51 (A) >60 mL/min/1.73 m 2    GFR If Non  42 (A) >60 mL/min/1.73 m 2   DIFFERENTIAL COMMENT   Result Value Ref Range    Comments-Diff see below    EKG   Result Value Ref Range    Report       [facility]  Emergency Dept.    Test Date:  2020  Pt Name:    ADDIS CROADO                 Department: [facility]  MRN:        [id number]                      Room:       -ROOM 7  Gender:     Male                         Technician: [id number]  :        [id number]                   Requested By:MANDY STOCKTON  Order #:    [id number]                    Reading MD: MANDY STOCKTON MD    Measurements  Intervals                                Axis  Rate:       67                           P:  MN:                                      QRS:        -60  QRSD:       106                          T:          250  QT:         496  QTc:        524    Interpretive Statements  Twelve-lead EKG shows atrial fibrillation with a ventricular to 67, QRS has  poor  R wave progression, no ST segment elevation or depression, T waves inverted  laterally  Electronically Signed On 2020 17:32:22 PST by MANDY STOCKTON MD         RADIOLOGY/PROCEDURES  DX-CHEST-PORTABLE (1 VIEW)   Final Result      1.  Cardiomegaly. Calcification projecting over the left cardiac silhouette may be related to prior infarct.      2.  Mild interstitial edema.      CT-CSPINE WITHOUT PLUS RECONS   Final Result         1.  Negative CT scan of the cervical spine.  No acute fracture or subluxation.      2.  Multilevel degenerative change greatest at C1-2 and C5-6.         CT-HEAD W/O   Final Result      1.  No acute intracranial process.      2.  Atrophy and small vessel ischemic change.            COURSE & MEDICAL DECISION MAKING  Pertinent Labs & Imaging studies reviewed. (See chart for details)  This an 84-year-old male who presents the emergency department acutely ill in appearance after a syncopal episode.  The patient was neurologically intact however based on his altered state I was concerned for an intracranial source therefore the patient had emergent CT imaging.  This does not show any evidence of an acute intracranial process.  The patient does have a pacemaker  and we are attempting to interrogate the pacemaker to see if this is from a cardiac source.  The patient has been in atrial fibrillation throughout his stay in the emergency department.  He did lose control of his bowels and a seizure would still be in the differential.  His alcohol is slightly elevated but I do not think it elevated enough that he could cause his syncopal episode.  This could also be from a multifactorial source.  At the time of admission he continues be neurologically intact.  I do not have a clear source and will bit the patient to the hospital for further work-up.  Of note the patient's renal insufficiency is stable.  He is slightly acidotic.  The patient has not had any chest pain throughout his stay and his troponin is slightly elevated and this will need to be followed with serial markers.    FINAL IMPRESSION  1.  Syncope  2.  Stable renal insufficiency  3.  Elevated troponin rule out acute coronary syndrome    Disposition  The patient will be admitted in stable condition         Electronically signed by: Herrera Womack M.D., 1/23/2020 4:03 PM

## 2020-01-24 NOTE — ASSESSMENT & PLAN NOTE
Acute on chronic CHF exacerbation  I's and O's  Cardiac diet  Fluid restriction  IV Lasix 20 mg bid  Keep potassium above 4 and magnesium above 2  Cont ACEI BBL added spironolactone

## 2020-01-24 NOTE — ED TRIAGE NOTES
Medics report upon their arrival pt was not being paced. En route pacemaker started working again.

## 2020-01-24 NOTE — PROGRESS NOTES
2 RN skin check complete.   Devices in place tele monitor, nasal cannula.  Skin assessed under devices intact.  Confirmed pressure ulcers found on n/a.  New potential pressure ulcers noted on n/a. Wound consult placed n/a.  The following interventions in place silicone tubing used for nasal cannula, patient turns self side to side, patient ambulates occassionally*    Healed scar on chest.  BLE edema.

## 2020-01-24 NOTE — PROGRESS NOTES
Per MD Baig's request, medical release form for [facility] cardiology reports/previous echocardiogram results faxed. Awaiting return fax with requested reports. Will alert MD when records are sent over to [facility].

## 2020-01-24 NOTE — PROGRESS NOTES
Telemetry Shift Summary    Rhythm Afib  HR Range 76-84  Ectopy RPVC, Rcoup  Measurements -/0.08/-        Normal Values  Rhythm SR  HR Range    Measurements 0.12-0.20 / 0.06-0.10  / 0.30-0.52

## 2020-01-24 NOTE — ED NOTES
Pt with another large episode of emesis in CT. Pt had difficulty staying still on table. Kept yelling and trying to move out of safety wrap. Upon moving him to/from table he kept making sexual comments to female nursing/CT staff. Pt redirectable but not oriented.

## 2020-01-24 NOTE — FLOWSHEET NOTE
01/23/20 2040   Type of Assessment   Assessment Yes   Patient History   Pulmonary Diagnosis none per patient   Home O2 Use Prior To Admission? No   Home Treatments/Frequency No   COPD Risk Screening   Do you have a history of COPD? No   COPD Population Screener   During the past 4 weeks, how much did you feel short of breath? 0   Do you ever cough up any mucus or phlegm? 0   In the past 12 months, you do less than you used to because of your breathing problems 0   Have you smoked at least 100 cigarettes in your entire life? 0   How old are you? 2   COPD Screening Score 2   Smoking History   Have you ever smoked Yes   Have you smoked in the last 12 months No   Confirm Quit Date 01/23/95   Level Of Consciousness   Level of Consciousness Alert   Respiratory WDL   Respiratory (WDL) WDL   Chest Exam   Respiration (!) 57   Pulse 72   Breath Sounds   Pre/Post Intervention Pre Intervention Assessment   RUL Breath Sounds Clear   RML Breath Sounds Clear   RLL Breath Sounds Clear   RULA Breath Sounds Clear   LLL Breath Sounds Clear   O2 Protocol   O2 (LPM) 4   Pulse Oximetry 95 %

## 2020-01-24 NOTE — PROGRESS NOTES
Orthostatics assessment completed, see flowsheet. Patient denies dizziness.     Unable to complete med rec due to patient not knowing what and how much he takes. Patient states he has a medication list at home that friend Mary Lou Martinez can bring in, however patient does not know his friend's phone number.    no fever and no chills.

## 2020-01-25 LAB
INR PPP: 1.77 (ref 0.87–1.13)
PROTHROMBIN TIME: 21 SEC (ref 12–14.6)

## 2020-01-25 PROCEDURE — 700111 HCHG RX REV CODE 636 W/ 250 OVERRIDE (IP): Performed by: INTERNAL MEDICINE

## 2020-01-25 PROCEDURE — A9270 NON-COVERED ITEM OR SERVICE: HCPCS | Performed by: INTERNAL MEDICINE

## 2020-01-25 PROCEDURE — G0378 HOSPITAL OBSERVATION PER HR: HCPCS

## 2020-01-25 PROCEDURE — 96376 TX/PRO/DX INJ SAME DRUG ADON: CPT

## 2020-01-25 PROCEDURE — 99225 PR SUBSEQUENT OBSERVATION CARE,LEVEL II: CPT | Performed by: INTERNAL MEDICINE

## 2020-01-25 PROCEDURE — 700102 HCHG RX REV CODE 250 W/ 637 OVERRIDE(OP): Performed by: INTERNAL MEDICINE

## 2020-01-25 PROCEDURE — 85610 PROTHROMBIN TIME: CPT

## 2020-01-25 RX ORDER — FUROSEMIDE 40 MG/1
40 TABLET ORAL
Status: DISCONTINUED | OUTPATIENT
Start: 2020-01-25 | End: 2020-01-26 | Stop reason: HOSPADM

## 2020-01-25 RX ORDER — WARFARIN SODIUM 5 MG/1
5 TABLET ORAL
Status: COMPLETED | OUTPATIENT
Start: 2020-01-25 | End: 2020-01-25

## 2020-01-25 RX ADMIN — FINASTERIDE 5 MG: 5 TABLET, FILM COATED ORAL at 05:26

## 2020-01-25 RX ADMIN — SPIRONOLACTONE 25 MG: 25 TABLET ORAL at 05:27

## 2020-01-25 RX ADMIN — SENNOSIDES AND DOCUSATE SODIUM 2 TABLET: 8.6; 5 TABLET ORAL at 16:28

## 2020-01-25 RX ADMIN — FUROSEMIDE 40 MG: 10 INJECTION, SOLUTION INTRAMUSCULAR; INTRAVENOUS at 05:28

## 2020-01-25 RX ADMIN — ATORVASTATIN CALCIUM 40 MG: 40 TABLET, FILM COATED ORAL at 21:05

## 2020-01-25 RX ADMIN — FUROSEMIDE 40 MG: 40 TABLET ORAL at 16:29

## 2020-01-25 RX ADMIN — FOLIC ACID 1 MG: 1 TABLET ORAL at 05:26

## 2020-01-25 RX ADMIN — TAMSULOSIN HYDROCHLORIDE 0.4 MG: 0.4 CAPSULE ORAL at 08:13

## 2020-01-25 RX ADMIN — Medication 100 MG: at 05:26

## 2020-01-25 RX ADMIN — LOSARTAN POTASSIUM 50 MG: 25 TABLET ORAL at 05:27

## 2020-01-25 RX ADMIN — METOPROLOL SUCCINATE 25 MG: 25 TABLET, EXTENDED RELEASE ORAL at 05:27

## 2020-01-25 RX ADMIN — WARFARIN SODIUM 5 MG: 5 TABLET ORAL at 16:29

## 2020-01-25 ASSESSMENT — ENCOUNTER SYMPTOMS
TINGLING: 0
SEIZURES: 0
WEAKNESS: 1
BACK PAIN: 0
WHEEZING: 0
COUGH: 0
DOUBLE VISION: 0
DEPRESSION: 0
HEADACHES: 0
SPEECH CHANGE: 0
FALLS: 1
PND: 0
LOSS OF CONSCIOUSNESS: 1
NAUSEA: 0
WEIGHT LOSS: 0
EYE PAIN: 0
FEVER: 0
DIZZINESS: 0
BLURRED VISION: 0
NECK PAIN: 0
SHORTNESS OF BREATH: 1
SPUTUM PRODUCTION: 0
CONSTIPATION: 0
HEARTBURN: 0
ABDOMINAL PAIN: 0
TREMORS: 0
PALPITATIONS: 0
FOCAL WEAKNESS: 0
DIAPHORESIS: 0
HEMOPTYSIS: 0

## 2020-01-25 ASSESSMENT — LIFESTYLE VARIABLES: SUBSTANCE_ABUSE: 0

## 2020-01-25 NOTE — PROGRESS NOTES
"Patient had a calm night, still requiring O2. When asked if he was willing to walk in roberts so we could monitor his oxygen saturation he stated \"i'm too tired right now.\" Patient did appear very sleepy.     0457- Patient resting in bed with eyes closed. Respirations are even and unlabored. Safety precautions in place. Call bell within reach. Will continue to monitor.   "

## 2020-01-25 NOTE — PROGRESS NOTES
Received report from Dionicio RN. Patient resting comfortably in bed. POC discussed. Patient denies any further needs at this time. Safety precautions in place. Call bell within reach. Will continue to monitor.

## 2020-01-25 NOTE — PROGRESS NOTES
Ambulation with oxygen attempted several times with patient. Unable to stay safely aroused for activity at this time, appears too sleepy. Will attempt later when patient is safer for ambulation.

## 2020-01-25 NOTE — PROGRESS NOTES
Telemetry Strip     Strip printed: [id number]  Measurements from am strip were as follows:  Rhythm: A-Fib  HR: 61  Measurements: -/0.08/-        Telemetry Shift Summary:    Rhythm:A-Fib  HR: 60-70's  Ectopy:Rare PVC        Normal Values  Rhythm SR  HR Range    Measurements 0.12-0.20 / 0.06-0.10  / 0.30-0.52

## 2020-01-25 NOTE — PROGRESS NOTES
Inpatient Anticoagulation Service Note    Date: 1/25/2020    Reason for Anticoagulation: Atrial Fibrillation, Myocardial Infarction   Target INR: 2.0 to 3.0  [id number] VASc Score: 6  HAS-BLED Score: 2   Hemoglobin Value: 14.8  Hematocrit Value: 46.6  Lab Platelet Value: (!) 159    INR from last 7 days     Date/Time INR Value    01/25/20 0030  (!) 1.77    01/23/20 1635  (!) 1.47        Dose from last 7 days     Date/Time Dose (mg)    01/25/20 1100  5    01/24/20 1415  5        Significant Interactions: Statin (If less than 5 days and overlap therapy discontinued -- document reason (i.e. Bleed Risk))    (If still on overlap therapy, if No -- document reason (i.e. Bleed Risk))         Plan:  Will give warfarin 5 mg po tonight for INR of 1.77     Pharmacist suggested discharge dosing: To be determined.     Moreno Ott, [facility]

## 2020-01-25 NOTE — PROGRESS NOTES
Telemetry Shift Summary    Rhythm A-Fib, partially paced  HR Range 60s-80s  Ectopy occasional PVC, rare couplets  Measurements --/0.08/--        Normal Values  Rhythm SR  HR Range    Measurements 0.12-0.20 / 0.06-0.10  / 0.30-0.52

## 2020-01-25 NOTE — CARE PLAN
Problem: Safety  Goal: Will remain free from injury  Outcome: PROGRESSING AS EXPECTED  Note:   Bed locked and in lowest position.   Goal: Will remain free from falls  Outcome: PROGRESSING AS EXPECTED  Note:   Patient wearing hospital gown and treaded slipper socks. Room properly lit while ambulating.

## 2020-01-25 NOTE — CARE PLAN
Problem: Discharge Barriers/Planning  Goal: Patient's continuum of care needs will be met  Outcome: PROGRESSING AS EXPECTED  Intervention: Involve patient and significant other/support system in setting and prioritizing goals for hospital stay and discharge  Note:   PCP and [facility] cardiology appointment arranged for discharge.     Problem: Fluid Volume:  Goal: Will maintain balanced intake and output  Outcome: PROGRESSING AS EXPECTED  Intervention: Monitor, educate, and encourage compliance with therapeutic intake of liquids  Note:   I/O's monitored. Diuretics in place.

## 2020-01-26 VITALS
WEIGHT: 214.73 LBS | TEMPERATURE: 97.3 F | HEART RATE: 58 BPM | DIASTOLIC BLOOD PRESSURE: 83 MMHG | RESPIRATION RATE: 18 BRPM | BODY MASS INDEX: 30.74 KG/M2 | SYSTOLIC BLOOD PRESSURE: 133 MMHG | HEIGHT: 70 IN | OXYGEN SATURATION: 94 %

## 2020-01-26 PROBLEM — I50.23 ACUTE ON CHRONIC SYSTOLIC CONGESTIVE HEART FAILURE (HCC): Status: RESOLVED | Noted: 2020-01-24 | Resolved: 2020-01-26

## 2020-01-26 PROBLEM — F10.929 ALCOHOL INTOXICATION (HCC): Status: RESOLVED | Noted: 2020-01-23 | Resolved: 2020-01-26

## 2020-01-26 PROBLEM — R55 SYNCOPE AND COLLAPSE: Status: RESOLVED | Noted: 2020-01-23 | Resolved: 2020-01-26

## 2020-01-26 LAB
ANION GAP SERPL CALC-SCNC: 10 MMOL/L (ref 0–11.9)
BUN SERPL-MCNC: 25 MG/DL (ref 8–22)
CALCIUM SERPL-MCNC: 8.4 MG/DL (ref 8.4–10.2)
CHLORIDE SERPL-SCNC: 105 MMOL/L (ref 96–112)
CO2 SERPL-SCNC: 28 MMOL/L (ref 20–33)
CREAT SERPL-MCNC: 1.53 MG/DL (ref 0.5–1.4)
GLUCOSE SERPL-MCNC: 110 MG/DL (ref 65–99)
INR PPP: 2.05 (ref 0.87–1.13)
POTASSIUM SERPL-SCNC: 3.7 MMOL/L (ref 3.6–5.5)
PROTHROMBIN TIME: 23.6 SEC (ref 12–14.6)
SODIUM SERPL-SCNC: 143 MMOL/L (ref 135–145)

## 2020-01-26 PROCEDURE — A9270 NON-COVERED ITEM OR SERVICE: HCPCS | Performed by: INTERNAL MEDICINE

## 2020-01-26 PROCEDURE — 99217 PR OBSERVATION CARE DISCHARGE: CPT | Performed by: INTERNAL MEDICINE

## 2020-01-26 PROCEDURE — 85610 PROTHROMBIN TIME: CPT

## 2020-01-26 PROCEDURE — 700102 HCHG RX REV CODE 250 W/ 637 OVERRIDE(OP): Performed by: INTERNAL MEDICINE

## 2020-01-26 PROCEDURE — 80048 BASIC METABOLIC PNL TOTAL CA: CPT

## 2020-01-26 PROCEDURE — G0378 HOSPITAL OBSERVATION PER HR: HCPCS

## 2020-01-26 RX ORDER — WARFARIN SODIUM 5 MG/1
5 TABLET ORAL
Status: DISCONTINUED | OUTPATIENT
Start: 2020-01-26 | End: 2020-01-26 | Stop reason: HOSPADM

## 2020-01-26 RX ORDER — SPIRONOLACTONE 25 MG/1
25 TABLET ORAL DAILY
Qty: 30 TAB | Refills: 0 | Status: SHIPPED | OUTPATIENT
Start: 2020-01-27

## 2020-01-26 RX ORDER — POTASSIUM CHLORIDE 20 MEQ/1
40 TABLET, EXTENDED RELEASE ORAL DAILY
Status: DISCONTINUED | OUTPATIENT
Start: 2020-01-26 | End: 2020-01-26 | Stop reason: HOSPADM

## 2020-01-26 RX ADMIN — FOLIC ACID 1 MG: 1 TABLET ORAL at 05:23

## 2020-01-26 RX ADMIN — TAMSULOSIN HYDROCHLORIDE 0.4 MG: 0.4 CAPSULE ORAL at 09:09

## 2020-01-26 RX ADMIN — FINASTERIDE 5 MG: 5 TABLET, FILM COATED ORAL at 05:23

## 2020-01-26 RX ADMIN — POTASSIUM CHLORIDE 40 MEQ: 20 TABLET, EXTENDED RELEASE ORAL at 11:35

## 2020-01-26 RX ADMIN — FUROSEMIDE 40 MG: 40 TABLET ORAL at 05:24

## 2020-01-26 RX ADMIN — Medication 100 MG: at 05:24

## 2020-01-26 RX ADMIN — SPIRONOLACTONE 25 MG: 25 TABLET ORAL at 05:23

## 2020-01-26 RX ADMIN — LOSARTAN POTASSIUM 50 MG: 25 TABLET ORAL at 05:24

## 2020-01-26 RX ADMIN — METOPROLOL SUCCINATE 25 MG: 25 TABLET, EXTENDED RELEASE ORAL at 05:23

## 2020-01-26 NOTE — PROGRESS NOTES
Discharge order written. IV removed, patient tolerated well. Tele removed and returned to monitor tech. Belongings returned. Pt stated that all personal belongings are in possession. AVS printed, reviewed and copy signed and placed on the chart. Patient has no further questions. Prescriptions sent to pharmacy of pt choice. Discharge in satisfactory condition home with neighbor. Pt off unit via WC, escorted by neighbor and staff escort.

## 2020-01-26 NOTE — DISCHARGE SUMMARY
Discharge Summary    CHIEF COMPLAINT ON ADMISSION  Chief Complaint   Patient presents with   • Syncope     pt was sitting at bar having lunch. bystanders said he put his head down like he was taking a nap, continued to then take off his shirt and then had syncopal episode. pt vomiting and incontinent of stool.       Reason for Admission  EMS,Vomiting     Admission Date  1/23/2020    CODE STATUS  Full Code    HPI & HOSPITAL COURSE  This is a 84 y.o. male here with Past medical history of heart failure presented with complaint of syncope.  Patient was also noticed to have hypoxemia.  Patient was noticed to have CHF exacerbation and started on diuretics IV.  Patient was not noticed to have ejection fraction 25 to 30% with left ventricle mural thrombosis.  Medical record obtained from [facility] showing patient had prolonged history of CHF and patient has been seen by another [facility] cardiology previously.  Patient was given ACE inhibitor beta-blocker.  Standing.  Patient medication was restarted and patient was also put on spironolactone.  Patient tolerated treatment and patient syncopal likely due to low cardiac output.  Patient has been stable and feeding significantly improved after above treatment.  Recommend patient to close follow-up with [facility] medical group as outpatient.  Patient agreed.  Patient said he is compliant with his medication at home.    Therefore, he is discharged in fair and stable condition to home with close outpatient follow-up.    The patient met 2-midnight criteria for an inpatient stay at the time of discharge.    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  Colin Granado M.D.  [address]  [place] [address]  [phone]    On 1/30/2020  Please arrive at 2:45pm for your hospital follow up. Thank you.     [facility]   [address] [place] [address]  [address]  [place] [address]  [phone]    The office will call to schedule your hospital follow up. If you do not hear from them within three business days, please call to schedule your appointment. Thank you.       MEDICATIONS ON DISCHARGE     Medication List      START taking these medications      Instructions   magnesium oxide 400 (241.3 Mg) MG Tabs tablet  Commonly known as:  MAG-OX   Take 1 Tab by mouth every day.  Dose:  400 mg     spironolactone 25 MG Tabs  Start taking on:  January 27, 2020  Commonly known as:  ALDACTONE   Take 1 Tab by mouth every day.  Dose:  25 mg        CONTINUE taking these medications      Instructions   allopurinol 100 MG Tabs  Commonly known as:  ZYLOPRIM   Take 100 mg by mouth 2 times a day.  Dose:  100 mg     atorvastatin 40 MG Tabs  Commonly known as:  LIPITOR   Take 40 mg by mouth every evening.  Dose:  40 mg     finasteride 5 MG Tabs  Commonly known as:  PROSCAR   Take 5 mg by mouth every day.  Dose:  5 mg     furosemide 40 MG Tabs  Commonly known as:  LASIX   Take 60 mg by mouth every day.  Dose:  60 mg     losartan 50 MG Tabs  Commonly known as:  COZAAR   Take 50 mg by mouth every day.  Dose:  50 mg     metoprolol 25 MG Tabs  Commonly known as:  LOPRESSOR   Take 25 mg by mouth 2 times a day.  Dose:  25 mg     tamsulosin 0.4 MG capsule  Commonly known as:  FLOMAX   Take 0.4 mg by mouth ONE-HALF HOUR AFTER BREAKFAST.  Dose:  0.4 mg     warfarin 5 MG Tabs  Commonly known as:  COUMADIN   Take 5 mg by mouth every day.  Dose:  5 mg            Allergies  No Known Allergies    DIET  Orders Placed This Encounter   Procedures   • Diet  Order Cardiac     Standing Status:   Standing     Number of Occurrences:   1     Order Specific Question:   Diet:     Answer:   Cardiac [6]       ACTIVITY  As tolerated.  Weight bearing as tolerated    CONSULTATIONS  none    PROCEDURES  None    EC-ECHOCARDIOGRAM COMPLETE W/ CONT   Final Result      DX-CHEST-PORTABLE (1 VIEW)   Final Result      1.  Cardiomegaly. Calcification projecting over the left cardiac silhouette may be related to prior infarct.      2.  Mild interstitial edema.      CT-CSPINE WITHOUT PLUS RECONS   Final Result         1.  Negative CT scan of the cervical spine.  No acute fracture or subluxation.      2.  Multilevel degenerative change greatest at C1-2 and C5-6.         CT-HEAD W/O   Final Result      1.  No acute intracranial process.      2.  Atrophy and small vessel ischemic change.          PE:  Gen: AAOx3, NAD  Eyes: PELLA  Neck: no JVD, no lymphadenopathy  Cardia: RRR, no mrg  Lungs: CTAB, no rales, rhonci or wheezing  Abd: NABS, soft, non extended, no mass  EXT: No C/C/E, peripheral pulse 2+ b/l  Neuro: CN II-XII intact, non focal, reflex 2+ symmetrical  Skin: Intact, no lesion, warm  Psych: Appropriate.      LABORATORY  Lab Results   Component Value Date    SODIUM 143 01/26/2020    POTASSIUM 3.7 01/26/2020    CHLORIDE 105 01/26/2020    CO2 28 01/26/2020    GLUCOSE 110 (H) 01/26/2020    BUN 25 (H) 01/26/2020    CREATININE 1.53 (H) 01/26/2020        Lab Results   Component Value Date    WBC 9.5 01/24/2020    HEMOGLOBIN 14.8 01/24/2020    HEMATOCRIT 46.6 01/24/2020    PLATELETCT 159 (L) 01/24/2020        Total time of the discharge process exceeds 38 minutes.

## 2020-01-26 NOTE — CARE PLAN
Problem: Communication  Goal: The ability to communicate needs accurately and effectively will improve  Outcome: PROGRESSING AS EXPECTED   Pt verbalizes needs      Problem: Safety  Goal: Will remain free from injury  Outcome: PROGRESSING AS EXPECTED  Goal: Will remain free from falls  Outcome: PROGRESSING AS EXPECTED   Safety measures in place

## 2020-01-26 NOTE — DISCHARGE INSTRUCTIONS
Discharge Instructions    Discharged to home by car with relative. Discharged via wheelchair, hospital escort: Yes.  Special equipment needed: Not Applicable    Be sure to schedule a follow-up appointment with your primary care doctor or any specialists as instructed.     Discharge Plan:   Diet Plan: Discussed  Activity Level: Discussed  Confirmed Follow up Appointment: Appointment Scheduled  Confirmed Symptoms Management: Discussed  Medication Reconciliation Updated: Yes  Influenza Vaccine Indication: Not indicated: Previously immunized this influenza season and > 8 years of age    I understand that a diet low in cholesterol, fat, and sodium is recommended for good health. Unless I have been given specific instructions below for another diet, I accept this instruction as my diet prescription.   Other diet: as tolerated    Special Instructions: None    · Is patient discharged on Warfarin / Coumadin?   No     Depression / Suicide Risk    As you are discharged from this [facility] facility, it is important to learn how to keep safe from harming yourself.    Recognize the warning signs:  · Abrupt changes in personality, positive or negative- including increase in energy   · Giving away possessions  · Change in eating patterns- significant weight changes-  positive or negative  · Change in sleeping patterns- unable to sleep or sleeping all the time   · Unwillingness or inability to communicate  · Depression  · Unusual sadness, discouragement and loneliness  · Talk of wanting to die  · Neglect of personal appearance   · Rebelliousness- reckless behavior  · Withdrawal from people/activities they love  · Confusion- inability to concentrate     If you or a loved one observes any of these behaviors or has concerns about self-harm, here's what you can do:  · Talk about it- your feelings and reasons for harming yourself  · Remove any means that you might use to hurt yourself (examples: pills, rope, extension cords,  firearm)  · Get professional help from the community (Mental Health, Substance Abuse, psychological counseling)  · Do not be alone:Call your Safe Contact- someone whom you trust who will be there for you.  · Call your local CRISIS HOTLINE 019-6855 or 442-495-2610  · Call your local Children's Mobile Crisis Response Team [place] (306) 698-2240 or www.girnarsoft  · Call the toll free National Suicide Prevention Hotlines   · National Suicide Prevention Lifeline 317-274-IUBF (5926)  · [employer] Data Line Network 800-SUICIDE (304-2520)      Spironolactone tablets  What is this medicine?  SPIRONOLACTONE (aamir on oh LAK tone) is a diuretic. It helps you make more urine and to lose excess water from your body. This medicine is used to treat high blood pressure, and edema or swelling from heart, kidney, or liver disease. It is also used to treat patients who make too much aldosterone or have low potassium.  This medicine may be used for other purposes; ask your health care provider or pharmacist if you have questions.  COMMON BRAND NAME(S): Aldactone  What should I tell my health care provider before I take this medicine?  They need to know if you have any of these conditions:  -high blood level of potassium  -kidney disease or trouble making urine  -liver disease  -an unusual or allergic reaction to spironolactone, other medicines, foods, dyes, or preservatives  -pregnant or trying to get pregnant  -breast-feeding  How should I use this medicine?  Take this medicine by mouth with a drink of water. Follow the directions on your prescription label. You can take it with or without food. If it upsets your stomach, take it with food. Do not take your medicine more often than directed. Remember that you will need to pass more urine after taking this medicine. Do not take your doses at a time of day that will cause you problems. Do not take at bedtime.  Talk to your pediatrician regarding the use of this medicine in  children. While this drug may be prescribed for selected conditions, precautions do apply.  Overdosage: If you think you have taken too much of this medicine contact a poison control center or emergency room at once.  NOTE: This medicine is only for you. Do not share this medicine with others.  What if I miss a dose?  If you miss a dose, take it as soon as you can. If it is almost time for your next dose, take only that dose. Do not take double or extra doses.  What may interact with this medicine?  Do not take this medicine with any of the following medications:  -eplerenone  This medicine may also interact with the following medications:  -corticosteroids  -digoxin  -lithium  -medicines for high blood pressure like ACE inhibitors  -skeletal muscle relaxants like tubocurarine  -NSAIDs, medicines for pain and inflammation, like ibuprofen or naproxen  -potassium products like salt substitute or supplements  -pressor amines like norepinephrine  -some diuretics  This list may not describe all possible interactions. Give your health care provider a list of all the medicines, herbs, non-prescription drugs, or dietary supplements you use. Also tell them if you smoke, drink alcohol, or use illegal drugs. Some items may interact with your medicine.  What should I watch for while using this medicine?  Visit your doctor or health care professional for regular checks on your progress. Check your blood pressure as directed. Ask your doctor what your blood pressure should be, and when you should contact them.  You may need to be on a special diet while taking this medicine. Ask your doctor. Also, ask how many glasses of fluid you need to drink a day. You must not get dehydrated.  This medicine may make you feel confused, dizzy or lightheaded. Drinking alcohol and taking some medicines can make this worse. Do not drive, use machinery, or do anything that needs mental alertness until you know how this medicine affects you. Do not  sit or stand up quickly.  What side effects may I notice from receiving this medicine?  Side effects that you should report to your doctor or health care professional as soon as possible:  -allergic reactions such as skin rash or itching, hives, swelling of the lips, mouth, tongue, or throat  -black or tarry stools  -fast, irregular heartbeat  -fever  -muscle pain, cramps  -numbness, tingling in hands or feet  -trouble breathing  -trouble passing urine  -unusual bleeding  -unusually weak or tired  Side effects that usually do not require medical attention (report to your doctor or health care professional if they continue or are bothersome):  -change in voice or hair growth  -confusion  -dizzy, drowsy  -dry mouth, increased thirst  -enlarged or tender breasts  -headache  -irregular menstrual periods  -sexual difficulty, unable to have an erection  -stomach upset  This list may not describe all possible side effects. Call your doctor for medical advice about side effects. You may report side effects to FDA at 5-018-FDA-5548.  Where should I keep my medicine?  Keep out of the reach of children.  Store below 25 degrees C (77 degrees F). Throw away any unused medicine after the expiration date.  NOTE: This sheet is a summary. It may not cover all possible information. If you have questions about this medicine, talk to your doctor, pharmacist, or health care provider.  © 2018 [employer]/Gold Standard (2011-08-30 12:51:30)

## 2020-01-26 NOTE — CARE PLAN
Problem: Communication  Goal: The ability to communicate needs accurately and effectively will improve  Outcome: PROGRESSING AS EXPECTED  Note:   A&Ox4. Patient able to communicate needs effectively and clearly.      Problem: Safety  Goal: Will remain free from injury  Outcome: PROGRESSING AS EXPECTED  Note:   Bed locked and in lowest position. Call bell within reach. Patient calls appropriately.  Goal: Will remain free from falls  Outcome: PROGRESSING AS EXPECTED  Note:   Bed locked and in lowest position. Patient wearing treaded slipper socks while ambulating.      Problem: Venous Thromboembolism (VTW)/Deep Vein Thrombosis (DVT) Prevention:  Goal: Patient will participate in Venous Thrombosis (VTE)/Deep Vein Thrombosis (DVT)Prevention Measures  Outcome: PROGRESSING AS EXPECTED  Flowsheets (Taken 1/25/2020 2015)  Risk Assessment Score: 1  VTE RISK: Moderate  Pharmacologic Prophylaxis Used: Warfarin (Coumadin)  Note:   Patient ambulates to and from bathroom throughout the day.

## 2020-01-26 NOTE — PROGRESS NOTES
[facility] Medicine Daily Progress Note    Date of Service  1/25/2020    Chief Complaint  84 y.o. male admitted 1/23/2020 with syncope event and also complains of shortness of breath    Hospital Course    Past medical history of heart failure presented with complaint of syncope.  Patient was also noticed to have hypoxemia.  Patient was noticed to have CHF exacerbation and started on diuretics IV.      Interval Problem Update  1/24.  Patient has been relatively stable overnight.  Still complains of shortness of breath and requires oxygen.  Patient has not been compliant with medication at home.  Echocardiogram showing no ejection fraction but not worsening compared to previously. Patient's pain is general 2-3/10, intermittent and does not radiate to other location, sharp and with some tingling. Can be controlled by pain meds.  1/25.  Patient feels shortness of breath slightly improved.  Tolerated diuretics.  Overall remained stable overnight.  No significant overnight adverse event. Patient otherwise denies fever, chills, nausea, vomiting, adb pain, CP, headache, constipation, diarrhea, cough, or sputum.      Consultants/Specialty  none    Code Status  full    Disposition  tbd    Review of Systems  Review of Systems   Constitutional: Positive for malaise/fatigue. Negative for diaphoresis, fever and weight loss.   HENT: Negative for congestion, ear discharge, ear pain, hearing loss and nosebleeds.    Eyes: Negative for blurred vision, double vision and pain.   Respiratory: Positive for shortness of breath. Negative for cough, hemoptysis, sputum production and wheezing.    Cardiovascular: Negative for chest pain, palpitations and PND.   Gastrointestinal: Negative for abdominal pain, constipation, heartburn and nausea.   Genitourinary: Negative for dysuria, frequency and hematuria.   Musculoskeletal: Positive for falls. Negative for back pain, joint pain and neck pain.   Skin: Negative for rash.   Neurological: Positive  for loss of consciousness and weakness. Negative for dizziness, tingling, tremors, speech change, focal weakness, seizures and headaches.   Psychiatric/Behavioral: Negative for depression, substance abuse and suicidal ideas.        Physical Exam  Temp:  [36.2 °C (97.1 °F)-36.7 °C (98.1 °F)] 36.7 °C (98 °F)  Pulse:  [59-82] 66  Resp:  [16-22] 16  BP: (127-145)/(74-93) 142/82  SpO2:  [90 %-100 %] 100 %    Physical Exam  Constitutional:       General: He is not in acute distress.     Appearance: Normal appearance. He is not ill-appearing or toxic-appearing.   HENT:      Head: Normocephalic and atraumatic.      Right Ear: External ear normal.      Left Ear: External ear normal.      Nose: No congestion or rhinorrhea.      Mouth/Throat:      Mouth: Mucous membranes are moist.      Pharynx: Oropharynx is clear. No oropharyngeal exudate.   Eyes:      Extraocular Movements: Extraocular movements intact.      Conjunctiva/sclera: Conjunctivae normal.      Pupils: Pupils are equal, round, and reactive to light.   Neck:      Musculoskeletal: Normal range of motion and neck supple. No neck rigidity or muscular tenderness.   Cardiovascular:      Rate and Rhythm: Normal rate and regular rhythm.      Pulses: Normal pulses.      Heart sounds: Normal heart sounds. No friction rub. No gallop.    Pulmonary:      Effort: Pulmonary effort is normal. No respiratory distress.      Breath sounds: Rales present. No wheezing.   Chest:      Chest wall: No tenderness.   Abdominal:      General: Abdomen is flat. Bowel sounds are normal. There is no distension.      Palpations: Abdomen is soft.      Tenderness: There is no guarding or rebound.      Hernia: No hernia is present.   Musculoskeletal: Normal range of motion.         General: No swelling or deformity.   Skin:     General: Skin is warm and dry.      Capillary Refill: Capillary refill takes more than 3 seconds.   Neurological:      General: No focal deficit present.      Mental Status:  He is alert and oriented to person, place, and time. Mental status is at baseline.      Motor: No weakness.   Psychiatric:         Mood and Affect: Mood normal.         Behavior: Behavior normal.         Fluids    Intake/Output Summary (Last 24 hours) at 1/25/2020 1624  Last data filed at 1/25/2020 1200  Gross per 24 hour   Intake 840 ml   Output 1950 ml   Net -1110 ml       Laboratory  Recent Labs     01/23/20  1600 01/24/20  0233   WBC 9.2 9.5   RBC 5.29 5.07   HEMOGLOBIN 15.4 14.8   HEMATOCRIT 48.2 46.6   MCV 91.1 91.9   MCH 29.1 29.2   MCHC 32.0* 31.8*   RDW 54.4* 54.9*   PLATELETCT 68* 159*   MPV 11.8 10.7     Recent Labs     01/23/20  1600 01/24/20  0233   SODIUM 145 145   POTASSIUM 4.1 4.0   CHLORIDE 107 108   CO2 17* 26   GLUCOSE 131* 112*   BUN 25* 27*   CREATININE 1.59* 1.67*   CALCIUM 9.1 8.9     Recent Labs     01/23/20  1635 01/25/20  0030   APTT 31.3  --    INR 1.47* 1.77*               Imaging  EC-ECHOCARDIOGRAM COMPLETE W/ CONT   Final Result      DX-CHEST-PORTABLE (1 VIEW)   Final Result      1.  Cardiomegaly. Calcification projecting over the left cardiac silhouette may be related to prior infarct.      2.  Mild interstitial edema.      CT-CSPINE WITHOUT PLUS RECONS   Final Result         1.  Negative CT scan of the cervical spine.  No acute fracture or subluxation.      2.  Multilevel degenerative change greatest at C1-2 and C5-6.         CT-HEAD W/O   Final Result      1.  No acute intracranial process.      2.  Atrophy and small vessel ischemic change.           Assessment/Plan  LV (left ventricular) mural thrombus  Assessment & Plan  Patient echocardiogram showing LV thrombus.  Medical record required and obtained from patient's previous cardiology and showing similar ejection fraction 25 to 30% with LV thrombus.    Apparently patient has been on anticoagulation therapy as outpatient warfarin and INR 1.47.->1.77  Restart warfarin and goal is 2-3.  However due to patient's LV thrombus likely  already chronic we will not bridge with heparin.  Recommend compliance    Acute on chronic systolic congestive heart failure (HCC)  Assessment & Plan  Acute on chronic CHF exacerbation  I's and O's  Cardiac diet  Fluid restriction  IV Lasix 20 mg bid changed to p.o. Lasix.  Keep potassium above 4 and magnesium above 2  Cont ACEI BBL added spironolactone      Alcohol intoxication ([facility])  Assessment & Plan  Patient has positive BAL, monitor for withdrawal as alcohol level drops  Will order thiamine and folate    Pacemaker  Assessment & Plan  In place and functioning    Coronary artery disease involving native coronary artery  Assessment & Plan  Patient has scar over his chest consistent with prior heart surgery, will check an echocardiogram due to his syncopal event    Syncope and collapse  Assessment & Plan  With loss of bowel control, will monitor on telemetry and ambulate the patient with staff  Will order orthostatic blood pressure measurements, negative  I suspect his syncope was due to alcohol consumption    Thrombocytopenia ([facility])  Assessment & Plan  No evidence of bleed  Will monitor level       VTE prophylaxis: SCD and warfarin      Current Facility-Administered Medications:   •  warfarin (COUMADIN) tablet 5 mg, 5 mg, Oral, ONCE AT 1800, Diamond Baig M.D.  •  furosemide (LASIX) tablet 40 mg, 40 mg, Oral, BID DIURETIC, Diamond Baig M.D.  •  atorvastatin (LIPITOR) tablet 40 mg, 40 mg, Oral, Nightly, Diamond Baig M.D., 40 mg at 01/24/20 2115  •  finasteride (PROSCAR) tablet 5 mg, 5 mg, Oral, DAILY, Diamond Baig M.D., 5 mg at 01/25/20 0526  •  losartan (COZAAR) tablet 50 mg, 50 mg, Oral, DAILY, Diamond Baig M.D., 50 mg at 01/25/20 0527  •  tamsulosin (FLOMAX) capsule 0.4 mg, 0.4 mg, Oral, AFTER BREAKFAST, Diamond Baig M.D., 0.4 mg at 01/25/20 0813  •  metoprolol SR (TOPROL XL) tablet 25 mg, 25 mg, Oral, Q DAY, Diamond Baig M.D., 25 mg at 01/25/20 0527  •  spironolactone (ALDACTONE) tablet 25 mg, 25 mg, Oral, Q DAY, Diamond Baig M.D., 25  mg at 01/25/20 0527  •  MD Alert...Warfarin per Pharmacy, , Other, PHARMACY TO DOSE, Diamond Baig M.D.  •  senna-docusate (PERICOLACE or SENOKOT S) 8.6-50 MG per tablet 2 Tab, 2 Tab, Oral, BID, Stopped at 01/24/20 1800 **AND** polyethylene glycol/lytes (MIRALAX) PACKET 1 Packet, 1 Packet, Oral, QDAY PRN **AND** magnesium hydroxide (MILK OF MAGNESIA) suspension 30 mL, 30 mL, Oral, QDAY PRN **AND** bisacodyl (DULCOLAX) suppository 10 mg, 10 mg, Rectal, QDAY PRN, Pippa Sweeney M.D.  •  Respiratory Care per Protocol, , Nebulization, Continuous RT, Pippa Sweeney M.D.  •  thiamine tablet 100 mg, 100 mg, Oral, DAILY, Pippa Sweeney M.D., 100 mg at 01/25/20 0526  •  folic acid (FOLVITE) tablet 1 mg, 1 mg, Oral, DAILY, Pippa Sweeney M.D., 1 mg at 01/25/20 0526

## 2020-01-26 NOTE — PROGRESS NOTES
Inpatient Anticoagulation Service Note    Date: 1/26/2020    Reason for Anticoagulation: Atrial Fibrillation, Myocardial Infarction   Target INR: 2.0 to 3.0  [id number] VASc Score: 6  HAS-BLED Score: 2   Hemoglobin Value: 14.8  Hematocrit Value: 46.6  Lab Platelet Value: (!) 159    INR from last 7 days     Date/Time INR Value    01/26/20 0417  (!) 2.05    01/25/20 0030  (!) 1.77    01/23/20 1635  (!) 1.47        Dose from last 7 days     Date/Time Dose (mg)    01/26/20 0900  5    01/25/20 1100  5    01/24/20 1415  5        Significant Interactions: Statin (If less than 5 days and overlap therapy discontinued -- document reason (i.e. Bleed Risk))    (If still on overlap therapy, if No -- document reason (i.e. Bleed Risk))         Plan:  Will give warfarin 5 mg po tonight for INR of 2.05     Pharmacist suggested discharge dosing: To be determined.     Moreno Ott, [facility]

## 2020-01-26 NOTE — PROGRESS NOTES
Telemetry Strip     Strip printed: [id number]  Measurements from am strip were as follows:  Rhythm: A-Fib  HR: 70  Measurements: -/0.10/-        Telemetry Shift Summary:    Rhythm: A-Fib  HR: 70-80's  Ectopy: Rare PVC, rare couplet        Normal Values  Rhythm SR  HR Range    Measurements 0.12-0.20 / 0.06-0.10  / 0.30-0.52

## 2020-01-26 NOTE — PROGRESS NOTES
"Patient refusing ambulation throughout the day, \"I'm too tired.\" Patient states he already walked the halls today, though no staff members can confirm this. Educated patient regarding ambulation importance, states he will ambulate in the halls after dinner. Provided juices per his request, I/O's continuing to be monitored.  "

## 2020-01-26 NOTE — PROGRESS NOTES
Hourly rounding complete. Patient resting in bed with eyes closed. Respirations are even and unlabored. Safety precautions in place. Call bell within reach. Will continue to monitor.

## 2020-01-26 NOTE — PROGRESS NOTES
Received report from MARQUES Ryan. Patient resting comfortably in bed. POC discussed. Patient is refusing to walk in the roberts at this time. Education provided. Patient has no complaints of pain and denies any further needs at this time. Safety precautions in place. Call bell within reach. Will continue to monitor.

## 2020-01-26 NOTE — PROGRESS NOTES
Telemetry Shift Summary    Rhythm AFib, partially paced  HR Range 60s-70s; down to 40  Ectopy rare PVC/couplets  Measurements --/0.08/--        Normal Values  Rhythm SR  HR Range    Measurements 0.12-0.20 / 0.06-0.10  / 0.30-0.52

## 2020-01-26 NOTE — PROGRESS NOTES
Report received from MARQUES Bingham. Pt resting comfortably in bed. Declines any needs at this time. Call light within reach, bed low/locked, bed alarm on. Will continue to monitor.

## 2020-01-27 NOTE — PROGRESS NOTES
Telemetry Shift Summary     Rhythm AFib  HR Range 65-80  Ectopy RPVCs, RCoup  Measurements -/0.08/-           Normal Values  Rhythm SR  HR Range    Measurements 0.12-0.20 / 0.06-0.10  / 0.30-0.52

## 2021-01-14 DIAGNOSIS — Z23 NEED FOR VACCINATION: ICD-10-CM
